# Patient Record
Sex: FEMALE | Race: WHITE | NOT HISPANIC OR LATINO | Employment: OTHER | URBAN - METROPOLITAN AREA
[De-identification: names, ages, dates, MRNs, and addresses within clinical notes are randomized per-mention and may not be internally consistent; named-entity substitution may affect disease eponyms.]

---

## 2024-01-10 ENCOUNTER — OFFICE VISIT (OUTPATIENT)
Dept: URGENT CARE | Facility: CLINIC | Age: 74
End: 2024-01-10
Payer: MEDICARE

## 2024-01-10 VITALS
TEMPERATURE: 98.6 F | OXYGEN SATURATION: 97 % | DIASTOLIC BLOOD PRESSURE: 86 MMHG | RESPIRATION RATE: 16 BRPM | SYSTOLIC BLOOD PRESSURE: 169 MMHG | HEART RATE: 74 BPM | WEIGHT: 200 LBS

## 2024-01-10 DIAGNOSIS — R03.0 ELEVATED BP WITHOUT DIAGNOSIS OF HYPERTENSION: Primary | ICD-10-CM

## 2024-01-10 PROCEDURE — 1036F TOBACCO NON-USER: CPT | Performed by: PHYSICIAN ASSISTANT

## 2024-01-10 PROCEDURE — 99203 OFFICE O/P NEW LOW 30 MIN: CPT | Performed by: PHYSICIAN ASSISTANT

## 2024-01-10 PROCEDURE — 1159F MED LIST DOCD IN RCRD: CPT | Performed by: PHYSICIAN ASSISTANT

## 2024-01-10 PROCEDURE — 1126F AMNT PAIN NOTED NONE PRSNT: CPT | Performed by: PHYSICIAN ASSISTANT

## 2024-01-10 ASSESSMENT — PAIN SCALES - GENERAL: PAINLEVEL: 0-NO PAIN

## 2024-01-11 PROBLEM — R03.0 ELEVATED BP WITHOUT DIAGNOSIS OF HYPERTENSION: Status: ACTIVE | Noted: 2024-01-11

## 2024-01-11 NOTE — PROGRESS NOTES
Subjective   Patient ID: Susanna Kimbrough is a 73 y.o. female.    Patient is a 73-year-old female who arrives for evaluation of elevated blood pressure reading that was obtained by her dentist prior to arrival at this urgent care facility today.  Patient reports that she reported is scheduled for dental procedure and staff measured her blood pressure using an automated wrist blood pressure cuff.  Patient states that her blood pressure was noted to be 210/120.  Patient states that she has no history of hypertension and does not take medication for same.  Patient denies chest pain, tightness or pressure and states she is experienced no acute changes to her vision.  Patient reports no headache, dizziness or other symptoms.  Patient denies recent illness and states otherwise that she is feeling in exceptional health.  Patient has no primary care physician.      The following portions of the chart were reviewed this encounter and updated as appropriate:       Review of Systems   Constitutional:         Elevated Blood Pressure Reading   All other systems reviewed and are negative.  Objective   Physical Exam  Vitals and nursing note reviewed.   Constitutional:       Appearance: Normal appearance. She is normal weight.   HENT:      Head: Normocephalic and atraumatic.      Right Ear: External ear normal.      Left Ear: External ear normal.      Nose: Nose normal. No congestion or rhinorrhea.      Mouth/Throat:      Mouth: Mucous membranes are moist.      Pharynx: Oropharynx is clear. No oropharyngeal exudate or posterior oropharyngeal erythema.   Eyes:      Extraocular Movements: Extraocular movements intact.      Conjunctiva/sclera: Conjunctivae normal.      Pupils: Pupils are equal, round, and reactive to light.   Cardiovascular:      Rate and Rhythm: Normal rate and regular rhythm.      Pulses: Normal pulses.      Heart sounds: Normal heart sounds.   Pulmonary:      Effort: Pulmonary effort is normal. No respiratory distress.       Breath sounds: Normal breath sounds. No stridor. No wheezing, rhonchi or rales.   Musculoskeletal:      Cervical back: Normal range of motion and neck supple.   Skin:     General: Skin is warm and dry.      Capillary Refill: Capillary refill takes less than 2 seconds.   Neurological:      General: No focal deficit present.      Mental Status: She is alert and oriented to person, place, and time.   Psychiatric:         Mood and Affect: Mood normal.         Behavior: Behavior normal.         Thought Content: Thought content normal.         Judgment: Judgment normal.     Assessment/Plan   Unremarkable physical exam findings as noted above.  Patient appears to be in excellent physical health at this time.  Blood patient's blood pressure was noted to be 169/86 upon arrival at our facility.  Patient was advised that wrist blood pressure cuffs are highly unreliable in addition to the fact that she was likely slightly anxious as she was scheduled to have a dental procedure this morning.  Patient states that her  does have an arm blood pressure cuff at home and she was advised to record her daily blood pressure readings and contact her primary care physician for further evaluation.  Patient states that she does not have a primary care physician and a referral for  Primary Care was placed.  Supportive care instructions were discussed and the patient verbalizes excellent understanding of same.    CLINICAL IMPRESSION:  Elevated Blood Pressure Reading    Diagnoses and all orders for this visit:  Elevated BP without diagnosis of hypertension  -     Referral to Primary Care; Future

## 2024-03-13 ENCOUNTER — OFFICE VISIT (OUTPATIENT)
Dept: URGENT CARE | Facility: CLINIC | Age: 74
End: 2024-03-13
Payer: MEDICARE

## 2024-03-13 VITALS
SYSTOLIC BLOOD PRESSURE: 164 MMHG | DIASTOLIC BLOOD PRESSURE: 79 MMHG | OXYGEN SATURATION: 96 % | TEMPERATURE: 97.4 F | RESPIRATION RATE: 16 BRPM | HEART RATE: 76 BPM | WEIGHT: 200 LBS

## 2024-03-13 DIAGNOSIS — L20.9 ATOPIC DERMATITIS, UNSPECIFIED TYPE: Primary | ICD-10-CM

## 2024-03-13 PROCEDURE — 1126F AMNT PAIN NOTED NONE PRSNT: CPT | Performed by: PHYSICIAN ASSISTANT

## 2024-03-13 PROCEDURE — 1036F TOBACCO NON-USER: CPT | Performed by: PHYSICIAN ASSISTANT

## 2024-03-13 PROCEDURE — 99213 OFFICE O/P EST LOW 20 MIN: CPT | Performed by: PHYSICIAN ASSISTANT

## 2024-03-13 PROCEDURE — 1159F MED LIST DOCD IN RCRD: CPT | Performed by: PHYSICIAN ASSISTANT

## 2024-03-13 RX ORDER — PREDNISONE 20 MG/1
20 TABLET ORAL 2 TIMES DAILY
Qty: 6 TABLET | Refills: 0 | Status: SHIPPED | OUTPATIENT
Start: 2024-03-13 | End: 2024-03-16

## 2024-03-13 ASSESSMENT — PAIN SCALES - GENERAL: PAINLEVEL: 0-NO PAIN

## 2024-03-13 NOTE — PROGRESS NOTES
Subjective   Patient ID: Susanna Kimbrough is a 73 y.o. female.    Female is a 73-year-old female who complains of an ongoing dry, intensely pruritic rash to the skin of her left toes and foot.  Patient states that she has been applying over-the-counter cortisone cream to her skin with no improvement in her symptoms.  Patient states that she has noted increased edema developing to her left foot over the past several days.  Patient denies pain and states she is able to bear weight and ambulate without difficulty.  Patient reports no recent injury to explain the swelling to her left foot.  She has no history of CHF or diabetes and has no history of neuropathy.  Patient states that her right foot and toes are asymptomatic.  Patient states that the skin to her left lower leg is also asymptomatic.      The following portions of the chart were reviewed this encounter and updated as appropriate:       Review of Systems   Skin:  Positive for rash.        Itching and Swelling to Left Foot   All other systems reviewed and are negative.  Objective   Physical Exam  Vitals and nursing note reviewed.   Constitutional:       Appearance: Normal appearance. She is normal weight.   Cardiovascular:      Pulses: Normal pulses.   Pulmonary:      Effort: Pulmonary effort is normal.      Breath sounds: Normal breath sounds.   Musculoskeletal:         General: Swelling present. No tenderness, deformity or signs of injury. Normal range of motion.      Right lower leg: No edema.      Left lower leg: No edema.   Skin:     General: Skin is warm and dry.      Capillary Refill: Capillary refill takes less than 2 seconds.      Findings: Rash present. No bruising, erythema or lesion.      Comments: Dry skin with exfoliation is noted to the left toes and distal left foot.  No vesicles, pustules or other skin lesions are noted.  There is no bleeding, serous or purulent fluid noted.  No erythema is present and there is no evidence of cellulitis or abscess.   Patient demonstrates 2+ edema to the left foot.  MSP to the left toes is fully intact and the patient demonstrates full range of motion of same.   Neurological:      General: No focal deficit present.      Mental Status: She is alert and oriented to person, place, and time.   Psychiatric:         Mood and Affect: Mood normal.         Behavior: Behavior normal.         Thought Content: Thought content normal.         Judgment: Judgment normal.     Assessment/Plan   Physical exam findings as noted above.  Patient was provided with a prescription for prednisone 20 mg and provided with contact information for Dr. Ruddy Figueroa.  Patient was strongly encouraged to schedule an appointment with Dr. Figueroa for further evaluation and management of her skin symptoms.  Patient is in agreement with this recommendation and states that she will schedule an appointment with Dr. Figueroa.    CLINICAL IMPRESSION:  Atopic Dermatitis Left Foot    Diagnoses and all orders for this visit:  Atopic dermatitis, unspecified type  -     predniSONE (Deltasone) 20 mg tablet; Take 1 tablet (20 mg) by mouth 2 times a day for 3 days.